# Patient Record
Sex: MALE
[De-identification: names, ages, dates, MRNs, and addresses within clinical notes are randomized per-mention and may not be internally consistent; named-entity substitution may affect disease eponyms.]

---

## 2018-10-29 ENCOUNTER — TRANSCRIPTION ENCOUNTER (OUTPATIENT)
Age: 38
End: 2018-10-29

## 2021-11-24 ENCOUNTER — TRANSCRIPTION ENCOUNTER (OUTPATIENT)
Age: 41
End: 2021-11-24

## 2022-02-04 ENCOUNTER — TRANSCRIPTION ENCOUNTER (OUTPATIENT)
Age: 42
End: 2022-02-04

## 2022-05-17 ENCOUNTER — APPOINTMENT (OUTPATIENT)
Dept: OTOLARYNGOLOGY | Facility: CLINIC | Age: 42
End: 2022-05-17
Payer: COMMERCIAL

## 2022-05-17 ENCOUNTER — NON-APPOINTMENT (OUTPATIENT)
Age: 42
End: 2022-05-17

## 2022-05-17 VITALS — TEMPERATURE: 96.7 F | BODY MASS INDEX: 30.73 KG/M2 | WEIGHT: 180 LBS | HEIGHT: 64 IN

## 2022-05-17 DIAGNOSIS — R06.83 SNORING: ICD-10-CM

## 2022-05-17 DIAGNOSIS — Z78.9 OTHER SPECIFIED HEALTH STATUS: ICD-10-CM

## 2022-05-17 PROBLEM — Z00.00 ENCOUNTER FOR PREVENTIVE HEALTH EXAMINATION: Status: ACTIVE | Noted: 2022-05-17

## 2022-05-17 PROCEDURE — 99203 OFFICE O/P NEW LOW 30 MIN: CPT

## 2022-05-17 NOTE — REVIEW OF SYSTEMS
[Problem Snoring] : problem snoring [Patient Intake Form Reviewed] : Patient intake form was reviewed

## 2022-05-17 NOTE — ASSESSMENT
[FreeTextEntry1] : LINDA SWENSON has severe worsening snoring and sleep disorder. I will refer him to Dr. Pink. 17-Jul-2019

## 2022-05-17 NOTE — HISTORY OF PRESENT ILLNESS
[de-identified] : LINDA SWENSON is a 41 year man with a history of many years of worsening sever snoring He doesn’t sleep well and uses sleep meds many nights.

## 2022-06-19 ENCOUNTER — NON-APPOINTMENT (OUTPATIENT)
Age: 42
End: 2022-06-19